# Patient Record
Sex: FEMALE | Race: WHITE | NOT HISPANIC OR LATINO
[De-identification: names, ages, dates, MRNs, and addresses within clinical notes are randomized per-mention and may not be internally consistent; named-entity substitution may affect disease eponyms.]

---

## 2018-02-13 ENCOUNTER — TRANSCRIPTION ENCOUNTER (OUTPATIENT)
Age: 68
End: 2018-02-13

## 2018-05-04 ENCOUNTER — OUTPATIENT (OUTPATIENT)
Dept: OUTPATIENT SERVICES | Facility: HOSPITAL | Age: 68
LOS: 1 days | End: 2018-05-04
Payer: MEDICARE

## 2018-05-04 DIAGNOSIS — Z98.811 DENTAL RESTORATION STATUS: Chronic | ICD-10-CM

## 2018-05-04 DIAGNOSIS — J45.30 MILD PERSISTENT ASTHMA, UNCOMPLICATED: ICD-10-CM

## 2018-05-04 PROCEDURE — 94729 DIFFUSING CAPACITY: CPT

## 2018-05-04 PROCEDURE — 94010 BREATHING CAPACITY TEST: CPT

## 2018-05-04 PROCEDURE — 94060 EVALUATION OF WHEEZING: CPT

## 2019-01-24 ENCOUNTER — EMERGENCY (EMERGENCY)
Facility: HOSPITAL | Age: 69
LOS: 1 days | Discharge: HOME | End: 2019-01-24
Attending: EMERGENCY MEDICINE
Payer: MEDICARE

## 2019-01-24 VITALS
OXYGEN SATURATION: 99 % | SYSTOLIC BLOOD PRESSURE: 132 MMHG | HEART RATE: 77 BPM | RESPIRATION RATE: 16 BRPM | WEIGHT: 149.91 LBS | DIASTOLIC BLOOD PRESSURE: 78 MMHG | HEIGHT: 62 IN | TEMPERATURE: 98 F

## 2019-01-24 VITALS
SYSTOLIC BLOOD PRESSURE: 115 MMHG | DIASTOLIC BLOOD PRESSURE: 57 MMHG | HEART RATE: 60 BPM | TEMPERATURE: 98 F | OXYGEN SATURATION: 100 % | RESPIRATION RATE: 20 BRPM

## 2019-01-24 DIAGNOSIS — Z98.811 DENTAL RESTORATION STATUS: Chronic | ICD-10-CM

## 2019-01-24 PROCEDURE — 99282 EMERGENCY DEPT VISIT SF MDM: CPT

## 2019-01-24 NOTE — ED PROVIDER NOTE - OBJECTIVE STATEMENT
68F with dry skin, has been itchy, went on internet and looked up skin eating bacteria, now worried that she has that on both of her knees bc knees are dry. Varicose veins have become more prominent. Saw her dermatologist for cracked heels, given ointment. No fever or chills.

## 2019-04-19 ENCOUNTER — TRANSCRIPTION ENCOUNTER (OUTPATIENT)
Age: 69
End: 2019-04-19

## 2021-06-25 ENCOUNTER — EMERGENCY (EMERGENCY)
Facility: HOSPITAL | Age: 71
LOS: 1 days | Discharge: HOME | End: 2021-06-25
Attending: STUDENT IN AN ORGANIZED HEALTH CARE EDUCATION/TRAINING PROGRAM
Payer: MEDICARE

## 2021-06-25 VITALS
HEIGHT: 60 IN | DIASTOLIC BLOOD PRESSURE: 107 MMHG | RESPIRATION RATE: 18 BRPM | SYSTOLIC BLOOD PRESSURE: 170 MMHG | TEMPERATURE: 99 F | OXYGEN SATURATION: 96 % | HEART RATE: 85 BPM | WEIGHT: 164.91 LBS

## 2021-06-25 VITALS
HEART RATE: 95 BPM | TEMPERATURE: 99 F | RESPIRATION RATE: 18 BRPM | OXYGEN SATURATION: 94 % | DIASTOLIC BLOOD PRESSURE: 79 MMHG | SYSTOLIC BLOOD PRESSURE: 146 MMHG

## 2021-06-25 DIAGNOSIS — Z98.811 DENTAL RESTORATION STATUS: Chronic | ICD-10-CM

## 2021-06-25 PROCEDURE — 73630 X-RAY EXAM OF FOOT: CPT | Mod: 26,LT

## 2021-06-25 PROCEDURE — 73630 X-RAY EXAM OF FOOT: CPT

## 2021-06-25 PROCEDURE — 93971 EXTREMITY STUDY: CPT | Mod: 26,LT

## 2021-06-25 PROCEDURE — 99284 EMERGENCY DEPT VISIT MOD MDM: CPT | Mod: 25

## 2021-06-25 PROCEDURE — 93971 EXTREMITY STUDY: CPT

## 2021-06-25 PROCEDURE — 99284 EMERGENCY DEPT VISIT MOD MDM: CPT | Mod: GC

## 2021-06-25 NOTE — ED PROVIDER NOTE - ATTENDING CONTRIBUTION TO CARE
leg pain seen in qdoc, imaging ordered, found to have bakers cyst on the L side, pt w/ no crepitus in the lower leg, no rashes on the lower extremities, no warmth of the leg, no features to suggest cellulitis, pt w/ 2+ DP pusle and intact cap refill <2 sec unlikely arterial occlusion, pt w/ no singificant swelling of the lower extremities- Findings c/w venous insufficiency. pt reports labs have been drawn in the past 2 months, normal po intake. pt reports she feels comfortable w/ dc will be walking .5 miles to  her car and feels ok doing that at this time. offered to assist w/ transportation through social work but pt declined

## 2021-06-25 NOTE — ED ADULT NURSE NOTE - OBJECTIVE STATEMENT
71 y/o female coming in from home complaining of left ankle and left knee pain. AOx4, ambulatory at baseline, PMH HTN, HLD, asthma. Pt. states she has had left ankle pain and left knee pain for approximately 2 weeks now with left ankle swelling. Left ankle appears more swollen than the right ankle. No redness, bruising, or discoloration noted. No discharge noted. PMS intact. Left and right knees appear equal in size and color. Denies any falls or trauma to the area. Pt. denies any fevers/chills. Denies chest pain, abdominal pain, SOB or any other complaints. Pt. is otherwise well. Answers some questions inappropriately. For example, when asked for her birthday, she states "Look at the bracelet. I don't know why everyone keeps asking me". Pt. also does not like anything explained to her. VSS. Will continue to reassess.

## 2021-06-25 NOTE — ED PROVIDER NOTE - PHYSICAL EXAMINATION
GENERAL: no acute distress, non-toxic appearing  HEENT: dry L eye conjunctiva, AYDE, EOMI without nystagmus, no proptosis of eye, TM's clear nonerythematous no vesicles appreciated, no oral lesions  CARDIAC: regular rate and regular rhythm, normal S1 and S2, no appreciable murmurs  PULM: clear to ascultation bilaterally, no appreciable crackles, rales, rhonchi, or wheezing, sats 100% on RA, no increased work of breathing  GI: abdomen nondistended, soft, nontender  : no CVA tenderness, no suprapubic tenderness  NEURO: alert and oriented x 3, normal speech, L facial weakness without forehead sparing  MSK: no visible deformities, no peripheral edema  SKIN: no visible rashes or vesicles  PSYCH: appropriate mood and affect GENERAL: no acute distress, non-toxic appearing  HEENT: normal conjunctiva, oral mucosa moist  CARDIAC: regular rate and regular rhythm, 2+ dp pulses bilaterally  PULM: no incr wob, sats well on RA  GI: abdomen nondistended, soft, nontender  : no suprapubic tenderness  NEURO: alert and oriented x 3, normal speech, good strength bilateral lower ext  MSK: L ankle/distal tib swelling, pt able to range ankle without pain, mild fulness behind L knee  SKIN: no visible rashes/skin breaks  PSYCH: appropriate mood and affect

## 2021-06-25 NOTE — ED PROVIDER NOTE - CLINICAL SUMMARY MEDICAL DECISION MAKING FREE TEXT BOX
Likely bell's palsy as forehead is weak as well, trouble closing eye no vision changes, will give meds/eye drops, neuro/ophtho f/u. DC. Likely venous insufficiency 2/2 baker's cyst. Will do US/xrays. Likely dc.

## 2021-06-25 NOTE — ED PROVIDER NOTE - NSFOLLOWUPCLINICSTOKEN_GEN_ALL_ED_FT
737352:4-6 Days|| ||00\01||False;707201:4-6 Days|| ||00\01||False; 029250:1-3 Days|| ||00\01||False;194015:1-3 Days|| ||00\01||False;

## 2021-06-25 NOTE — ED PROVIDER NOTE - NSFOLLOWUPINSTRUCTIONS_ED_ALL_ED_FT
- Please follow up with your Primary Care Doctor within 48 hours. Bring your results from today.    - Please follow up with Neurology within 1 week.     - Please follow up with Ophthalmology within 1 week.     - Take prescribed medications as indicated:  1. Prednisone  2. Valacyclovir  3. Eye drops (daytime)  4. Eye ointment (before bed/before naps)    - You may also tape your eyes closed before you sleep and/or during the day as this will help prevent eye trauma and eye dryness.    - Tylenol up to 650 mg every 6 hours as needed for pain and/or Ibuprofen up to 600 mg every 6 hours as needed for pain.    - Be sure to return to the ED if you develop new or worsening symptoms. Specific signs and symptoms to be vigilant of: fever or chills, chest pain, difficulty breathing, palpitations, lightheadedness or loss of consciousness, headache, vision changes, slurred speech, difficulty swallowing or drooling, facial droop, weakness in the arms or legs, or any other distressing symptoms. - Please follow up with your Primary Care Doctor within 48 hours. Bring your results from today.    - Please follow up with Sports Medicine Clinic for your knee cyst.    - You may try compression socks or wrapping extremity with ace wrap. Elevate leg.     - Tylenol up to 650 mg every 6 hours as needed for pain.     - Be sure to return to the ED if you develop new or worsening symptoms. Specific signs and symptoms to be vigilant of: fever or chills, chest pain, difficulty breathing, palpitations, lightheadedness or loss of consciousness, difficulty ambulating, or any other distressing symptoms.

## 2021-06-25 NOTE — ED ADULT NURSE NOTE - NSIMPLEMENTINTERV_GEN_ALL_ED
Implemented All Universal Safety Interventions:  [place] to call system. Call bell, personal items and telephone within reach. Instruct patient to call for assistance. Room bathroom lighting operational. Non-slip footwear when patient is off stretcher. Physically safe environment: no spills, clutter or unnecessary equipment. Stretcher in lowest position, wheels locked, appropriate side rails in place.

## 2021-06-25 NOTE — ED PROVIDER NOTE - RAPID ASSESSMENT
70 F with PMHx of HLD presents with LLE edema x2 weeks. Pt able to ambulate without pain. States PMD concerned it can possibly be related to HLD medications. Was told to discontinue medication. Denies fever, chest pain, SOB, trauma, fall, numbness, weakness, or tingling to LLE, hx of VTE.     Scribe Statement: Benji LEIVA Tiffany, attest that this documentation has been prepared under the direction and in the presence of Lisa Torres (PA) 70 F with PMHx of HLD presents with LLE edema x2 weeks. Pt able to ambulate without pain. States PMD concerned it can possibly be related to HLD medications. Was told to discontinue medication. Denies fever, chest pain, SOB, trauma, fall, numbness, weakness, or tingling to LLE, hx of VTE.     Scribe Statement: Benji LEIVA Tiffany, attest that this documentation has been prepared under the direction and in the presence of Lisa Torres (PA)    **Pt seen, briefly, in the waiting room by Lisa Torres PA-C for rapid assessment. documentation completed by Ro Leblanc Pt to be sent to main ED for full physical examination and assessment - all orders placed to be followed by MD in the main ED**

## 2021-06-25 NOTE — ED PROVIDER NOTE - PATIENT PORTAL LINK FT
You can access the FollowMyHealth Patient Portal offered by [facility] by registering at the following website: [email or web address]. By joining [employer]’s FollowMyHealth portal, you will also be able to view your health information using other applications (apps) compatible with our system. You can access the FollowMyHealth Patient Portal offered by [facility] by registering at the following website: [email or web address]. By joining [employer]’s FollowMyHealth portal, you will also be able to view your health information using other applications (apps) compatible with our system.

## 2021-06-25 NOTE — ED PROVIDER NOTE - OBJECTIVE STATEMENT
70F PMH HTN presents with L eye dryness for 3 days and L facial weakness noticed today. Denies any loss of vision. Denies any head trauma, headaches, weakness/numbness/tingling anywhere else in the body, denies any recent fevers/chills, uri sxs, ear pain, cough, chest pain, sob, palpitations, vomiting/diarrhea, rash. No recent hiking/tick bites. Went to  yesterday and was dx with pink eye and given abx drops. 70F PMH HTN presents with L eye dryness for 3 days and L facial weakness noticed today. Denies any loss of vision. Denies any head trauma, headaches, weakness/numbness/tingling anywhere else in the body, denies any recent fevers/chills, uri sxs, ear pain, cough, chest pain, sob, palpitations, vomiting/diarrhea, rash. No recent hiking/tick bites. Went to  yesterday and was dx with pink eye and given abx drops.    +L eye dryness, L facial weakness    GENERAL: no acute distress, non-toxic appearing  HEENT: dry L eye conjunctiva, AYDE, EOMI without nystagmus, no proptosis of eye, TM's clear nonerythematous no vesicles appreciated, no oral lesions  CARDIAC: regular rate and regular rhythm, normal S1 and S2, no appreciable murmurs  PULM: clear to ascultation bilaterally, no appreciable crackles, rales, rhonchi, or wheezing, sats 100% on RA, no increased work of breathing  GI: abdomen nondistended, soft, nontender  : no CVA tenderness, no suprapubic tenderness  NEURO: alert and oriented x 3, normal speech, L facial weakness without forehead sparing  MSK: no visible deformities, no peripheral edema  SKIN: no visible rashes or vesicles  PSYCH: appropriate mood and affect      Likely bell's palsy as forehead is weak as well, trouble closing eye no vision changes, will give meds/eye drops, neuro/ophtho f/u. DC. 70F PMH HLD presents to the ED with LLE swelling for 2 weeks in the context of some knee pain. Knee pain atraumatic and located posteriorly. Denies any rash, fevers/chills, chest pain/sob. Pt is able to ambulate and bear weight. Denies weakness of extremity.

## 2021-06-25 NOTE — ED PROVIDER NOTE - NSFOLLOWUPCLINICS_GEN_ALL_ED_FT
[facility] Ophthalmology  Ophthalmology  [address]  [place] [address]  Phone: [phone]  Fax:   Follow Up Time: 4-6 Days    [facility] Specialty Clinics  Neurology  [address] [place] [address] [address] [place] [address]  Phone: [phone]  Fax:   Follow Up Time: 4-6 Days     [facility] Sports Medicine  Sports Medicine  [address] [place] [address]  Phone: [phone]  Fax:   Follow Up Time: 1-3 Days    [facility]  Internal Medicine  [address] [place] [address]  Phone: [phone]  Fax: [phone]  Follow Up Time: 1-3 Days

## 2022-12-13 NOTE — ED ADULT NURSE NOTE - NS TRANSFER PATIENT BELONGINGS
Clothing Opioid Counseling: I discussed with the patient the potential side effects of opioids including but not limited to addiction, altered mental status, and depression. I stressed avoiding alcohol, benzodiazepines, muscle relaxants and sleep aids unless specifically okayed by a physician. The patient verbalized understanding of the proper use and possible adverse effects of opioids. All of the patient's questions and concerns were addressed. They were instructed to flush the remaining pills down the toilet if they did not need them for pain.

## 2023-04-06 NOTE — ED ADULT TRIAGE NOTE - NS ED TRIAGE HISTORIAN
[FreeTextEntry1] : General - pleasant well developed \par Head/Face - Atraumatic\par Eyes - PERRL, no sclera icterus\par ENMT - MMM, normal nose/ears, no oropharyngeal lesion\par Neck - supple, no thyromegaly, no masses   \par Lymphatics - no palpable adenopathy\par CV - Regular Rate and Rhythm, no rub or gallop\par Pulm - CTAB\par Abd - soft, non-tender, nondistended, palpable liver a few cm below the xiphoid process. \par Ext - no c/c/e, normal ROM in each extremity \par Back/spine - no masses/tenderness\par Skin - no skin lesion on sun exposed skin, no jaundice\par Neuro - alert and oriented x 3\par Psych - normal mood and affect\par \par  Patient

## 2024-06-11 ENCOUNTER — APPOINTMENT (OUTPATIENT)
Dept: ORTHOPEDIC SURGERY | Facility: CLINIC | Age: 74
End: 2024-06-11
Payer: MEDICARE

## 2024-06-11 VITALS — WEIGHT: 166 LBS | BODY MASS INDEX: 32.59 KG/M2 | HEIGHT: 60 IN

## 2024-06-11 DIAGNOSIS — I10 ESSENTIAL (PRIMARY) HYPERTENSION: ICD-10-CM

## 2024-06-11 DIAGNOSIS — Z78.9 OTHER SPECIFIED HEALTH STATUS: ICD-10-CM

## 2024-06-11 DIAGNOSIS — J45.909 UNSPECIFIED ASTHMA, UNCOMPLICATED: ICD-10-CM

## 2024-06-11 PROCEDURE — 73562 X-RAY EXAM OF KNEE 3: CPT | Mod: RT

## 2024-06-11 PROCEDURE — 99203 OFFICE O/P NEW LOW 30 MIN: CPT

## 2024-06-11 NOTE — ASSESSMENT
[FreeTextEntry1] : R knee pain over last few days without injury. Exam and XR consistent with OA.   -Discussed the pathophysiology of knee OA along with treatments including survellience/monitoring, use of NSAIDs/Tylenol/ice, use of compression sleeve, HEP, PT, injection therapy, procedural and surgical therapy.  - Recommended trial of tylenol or nsaid as tolerated - Offered PT referral - Follow up in 4-6 weeks if not improved, could consider CSI

## 2024-06-11 NOTE — HISTORY OF PRESENT ILLNESS
[6] : 6 [0] : 0 [Intermittent] : intermittent [Ice] : ice [Standing] : standing [Walking] : walking [Stairs] : stairs [de-identified] : 06/11/2024: Patient is a 73 year y.o. female presenting for evaluation of right knee pain. Onset: a few days ago. Pt states that she has been using her right knee to climb on top her pillow top bed and has been noticing right knee pain since. Pain intensity has remained the same, walking, standing and stairs aggravate her knee pain. No radiating pain, no numbness/tingling, no weakness, no swelling. Pt has been using ice as needed for relief, no OTC treatment. Non-smoker, no blood thinners, nondiabetic.    [] : no [FreeTextEntry1] : right knee

## 2024-06-11 NOTE — IMAGING
[de-identified] : L knee: - No obvious deformity or swelling - No pain with palpation of medial or lateral joint line. - ROM from 135 degrees of flexion to 0 degrees extension. - 5/5 strength with knee flexion and extension - Stable varus, valgus testing - Distally neurovascularly intact.    R knee: - No obvious deformity or swelling - Medial joint line pain - No pain with palpation of lateral joint line. - ROM from 125 degrees of flexion to 0 degrees extension. - 5/5 strength with knee flexion and extension - Stable varus, valgus testing - Mild pain with valgus stressing without laxity - Distally neurovascularly intact.    [Right] : right knee [There are no fractures, subluxations or dislocations. No significant abnormalities are seen] : There are no fractures, subluxations or dislocations. No significant abnormalities are seen [Mild tricompartmental OA lateral narrowing] : Mild tricompartmental OA lateral narrowing [Moderate tricompartmental OA medial narrowing] : Moderate tricompartmental OA medial narrowing [Mild patellofemoral OA] : Mild patellofemoral OA

## 2024-06-11 NOTE — PHYSICAL EXAM
[de-identified] : Constitutional: Well developed, well nourished, able to communicate Neuro: Normal sensation, No focal deficits Skin: Intact CV: Peripheral vascular exam grossly normal Pulm: No signs of respiratory distress Psych: Oriented, normal mood and affect

## 2024-06-14 ENCOUNTER — APPOINTMENT (OUTPATIENT)
Dept: ORTHOPEDIC SURGERY | Facility: CLINIC | Age: 74
End: 2024-06-14
Payer: MEDICARE

## 2024-06-14 PROCEDURE — 99213 OFFICE O/P EST LOW 20 MIN: CPT | Mod: 25

## 2024-06-14 PROCEDURE — J3490M: CUSTOM | Mod: NC

## 2024-06-14 PROCEDURE — 20610 DRAIN/INJ JOINT/BURSA W/O US: CPT | Mod: RT

## 2024-06-14 NOTE — PHYSICAL EXAM
[de-identified] : Constitutional: Well developed, well nourished, able to communicate Neuro: Normal sensation, No focal deficits Skin: Intact CV: Peripheral vascular exam grossly normal Pulm: No signs of respiratory distress Psych: Oriented, normal mood and affect

## 2024-06-14 NOTE — IMAGING
[Right] : right knee [There are no fractures, subluxations or dislocations. No significant abnormalities are seen] : There are no fractures, subluxations or dislocations. No significant abnormalities are seen [Mild tricompartmental OA lateral narrowing] : Mild tricompartmental OA lateral narrowing [Moderate tricompartmental OA medial narrowing] : Moderate tricompartmental OA medial narrowing [Mild patellofemoral OA] : Mild patellofemoral OA [de-identified] : L knee: - No obvious deformity or swelling - No pain with palpation of medial or lateral joint line. - ROM from 135 degrees of flexion to 0 degrees extension. - 5/5 strength with knee flexion and extension - Stable varus, valgus testing - Distally neurovascularly intact.    R knee: - No obvious deformity or swelling - Medial joint line pain - No pain with palpation of lateral joint line. - ROM from 125 degrees of flexion to 0 degrees extension. - 5/5 strength with knee flexion and extension - Stable varus, valgus testing - Mild pain with valgus stressing without laxity - Distally neurovascularly intact.

## 2024-06-14 NOTE — HISTORY OF PRESENT ILLNESS
[6] : 6 [0] : 0 [Intermittent] : intermittent [Ice] : ice [Standing] : standing [Walking] : walking [Stairs] : stairs [de-identified] : 06/14/2024: patient is here to follow up on right knee pain. she states that since last visit she is feeling same as last visit. Hasn't started PT yet.   06/11/2024: Patient is a 73 year y.o. female presenting for evaluation of right knee pain. Onset: a few days ago. Pt states that she has been using her right knee to climb on top her pillow top bed and has been noticing right knee pain since. Pain intensity has remained the same, walking, standing and stairs aggravate her knee pain. No radiating pain, no numbness/tingling, no weakness, no swelling. Pt has been using ice as needed for relief, no OTC treatment. Non-smoker, no blood thinners, nondiabetic.    [] : no [FreeTextEntry1] : right knee

## 2024-06-14 NOTE — IMAGING
[Right] : right knee [There are no fractures, subluxations or dislocations. No significant abnormalities are seen] : There are no fractures, subluxations or dislocations. No significant abnormalities are seen [Mild tricompartmental OA lateral narrowing] : Mild tricompartmental OA lateral narrowing [Moderate tricompartmental OA medial narrowing] : Moderate tricompartmental OA medial narrowing [Mild patellofemoral OA] : Mild patellofemoral OA [de-identified] : L knee: - No obvious deformity or swelling - No pain with palpation of medial or lateral joint line. - ROM from 135 degrees of flexion to 0 degrees extension. - 5/5 strength with knee flexion and extension - Stable varus, valgus testing - Distally neurovascularly intact.    R knee: - No obvious deformity or swelling - Medial joint line pain - No pain with palpation of lateral joint line. - ROM from 125 degrees of flexion to 0 degrees extension. - 5/5 strength with knee flexion and extension - Stable varus, valgus testing - Mild pain with valgus stressing without laxity - Distally neurovascularly intact.

## 2024-06-14 NOTE — ASSESSMENT
[FreeTextEntry1] : R knee pain over last few days without injury. Exam and XR consistent with OA.   -Discussed the pathophysiology of knee OA along with treatments including survellience/monitoring, use of NSAIDs/Tylenol/ice, use of compression sleeve, HEP, PT, injection therapy, procedural and surgical therapy.  - CSI Right knee given and tolerated well - recommend tylenol and ice as needed for injection site relief -Will start PT -Follow up 3 months or sooner if no relief with inj

## 2024-06-14 NOTE — HISTORY OF PRESENT ILLNESS
[6] : 6 [0] : 0 [Intermittent] : intermittent [Ice] : ice [Standing] : standing [Walking] : walking [Stairs] : stairs [de-identified] : 06/14/2024: patient is here to follow up on right knee pain. she states that since last visit she is feeling same as last visit. Hasn't started PT yet.   06/11/2024: Patient is a 73 year y.o. female presenting for evaluation of right knee pain. Onset: a few days ago. Pt states that she has been using her right knee to climb on top her pillow top bed and has been noticing right knee pain since. Pain intensity has remained the same, walking, standing and stairs aggravate her knee pain. No radiating pain, no numbness/tingling, no weakness, no swelling. Pt has been using ice as needed for relief, no OTC treatment. Non-smoker, no blood thinners, nondiabetic.    [] : no [FreeTextEntry1] : right knee

## 2024-06-14 NOTE — PROCEDURE
[FreeTextEntry3] : A pre-procedure verification was performed by asking the patient to state their name, , and the location of the procedure being performed in their own words.  A review of allergies was accomplished through dialog and the patient, ending thus in the full agreement. The risks and benefits were explained and consent were obtained verbally.   Procedure: Intraarticular Injection >>  Knee, R Consent was obtained. Patients questions were answered to the best of my ability prior to procedure. Injection site and surrounding bony landmarks were palpated and marked prior to proceeding. Site was cleaned and prepped in the typical fashion using alcohol swabs. Cold spray used on skin for patient comfort. Needle was advanced into the intraarticular space from the inferolateral approach. A combination of 3cc Marcaine w/o Epi and 80mg Depomedrol was then injected into the joint. Patient tolerated the procedure well, without significant complications. Minimal (<3cc) blood loss experienced.

## 2024-06-14 NOTE — PHYSICAL EXAM
[de-identified] : Constitutional: Well developed, well nourished, able to communicate Neuro: Normal sensation, No focal deficits Skin: Intact CV: Peripheral vascular exam grossly normal Pulm: No signs of respiratory distress Psych: Oriented, normal mood and affect

## 2024-06-25 ENCOUNTER — APPOINTMENT (OUTPATIENT)
Dept: ORTHOPEDIC SURGERY | Facility: CLINIC | Age: 74
End: 2024-06-25
Payer: MEDICARE

## 2024-06-25 PROCEDURE — 20610 DRAIN/INJ JOINT/BURSA W/O US: CPT | Mod: RT

## 2024-06-25 PROCEDURE — 99213 OFFICE O/P EST LOW 20 MIN: CPT | Mod: 25

## 2024-07-02 ENCOUNTER — APPOINTMENT (OUTPATIENT)
Dept: ORTHOPEDIC SURGERY | Facility: CLINIC | Age: 74
End: 2024-07-02
Payer: MEDICARE

## 2024-07-02 PROBLEM — M17.11 PRIMARY OSTEOARTHRITIS OF RIGHT KNEE: Status: ACTIVE | Noted: 2024-06-11

## 2024-07-02 PROCEDURE — 20610 DRAIN/INJ JOINT/BURSA W/O US: CPT | Mod: RT

## 2024-07-09 ENCOUNTER — APPOINTMENT (OUTPATIENT)
Dept: ORTHOPEDIC SURGERY | Facility: CLINIC | Age: 74
End: 2024-07-09
Payer: MEDICARE

## 2024-07-09 DIAGNOSIS — M17.11 UNILATERAL PRIMARY OSTEOARTHRITIS, RIGHT KNEE: ICD-10-CM

## 2024-07-09 PROCEDURE — 20610 DRAIN/INJ JOINT/BURSA W/O US: CPT | Mod: RT

## 2024-07-09 RX ORDER — ALBUTEROL 90 MCG
AEROSOL (GRAM) INHALATION
Refills: 0 | Status: ACTIVE | COMMUNITY

## 2024-07-09 RX ORDER — MONTELUKAST SODIUM 10 MG/1
TABLET, FILM COATED ORAL
Refills: 0 | Status: ACTIVE | COMMUNITY

## 2024-07-09 RX ORDER — LISINOPRIL 30 MG/1
TABLET ORAL
Refills: 0 | Status: ACTIVE | COMMUNITY

## 2024-07-09 RX ORDER — UMECLIDINIUM 62.5 UG/1
AEROSOL, POWDER ORAL
Refills: 0 | Status: ACTIVE | COMMUNITY

## 2024-07-09 RX ORDER — FLUTICASONE FUROATE AND VILANTEROL TRIFENATATE 50; 25 UG/1; UG/1
POWDER RESPIRATORY (INHALATION)
Refills: 0 | Status: ACTIVE | COMMUNITY

## 2024-07-17 ENCOUNTER — APPOINTMENT (OUTPATIENT)
Dept: ORTHOPEDIC SURGERY | Facility: CLINIC | Age: 74
End: 2024-07-17

## 2024-12-19 ENCOUNTER — OFFICE (OUTPATIENT)
Facility: LOCATION | Age: 74
Setting detail: OPHTHALMOLOGY
End: 2024-12-19
Payer: MEDICARE

## 2024-12-19 DIAGNOSIS — H01.004: ICD-10-CM

## 2024-12-19 DIAGNOSIS — H52.7: ICD-10-CM

## 2024-12-19 DIAGNOSIS — H01.001: ICD-10-CM

## 2024-12-19 DIAGNOSIS — H25.13: ICD-10-CM

## 2024-12-19 DIAGNOSIS — H11.153: ICD-10-CM

## 2024-12-19 PROCEDURE — 92004 COMPRE OPH EXAM NEW PT 1/>: CPT | Performed by: OPHTHALMOLOGY

## 2024-12-19 ASSESSMENT — REFRACTION_CURRENTRX
OD_CYLINDER: 0.00
OD_SPHERE: +3.00
OS_SPHERE: +3.00
OD_AXIS: 180
OS_AXIS: 103
OS_CYLINDER: -0.50
OS_OVR_VA: 20/
OD_OVR_VA: 20/

## 2024-12-19 ASSESSMENT — REFRACTION_AUTOREFRACTION
OD_CYLINDER: -0.75
OS_AXIS: 111
OS_SPHERE: +0.50
OD_SPHERE: +0.75
OD_AXIS: 089
OS_CYLINDER: -1.00

## 2024-12-19 ASSESSMENT — TONOMETRY
OS_IOP_MMHG: 19
OD_IOP_MMHG: 19

## 2024-12-19 ASSESSMENT — REFRACTION_MANIFEST
OD_SPHERE: +3.00
OD_CYLINDER: SPH
OS_AXIS: 105
OS_CYLINDER: -0.50
OS_SPHERE: +3.00

## 2024-12-19 ASSESSMENT — KERATOMETRY
OS_K1POWER_DIOPTERS: 43.25
OD_K1POWER_DIOPTERS: 44.00
OD_K2POWER_DIOPTERS: 44.50
OS_K2POWER_DIOPTERS: 43.50
OD_AXISANGLE_DEGREES: 114
OS_AXISANGLE_DEGREES: 068

## 2024-12-19 ASSESSMENT — LID EXAM ASSESSMENTS
OD_BLEPHARITIS: RUL 1+
OS_BLEPHARITIS: LUL 1+

## 2024-12-19 ASSESSMENT — VISUAL ACUITY
OS_BCVA: 20/30+2
OD_BCVA: 20/30+2

## 2024-12-19 ASSESSMENT — CONFRONTATIONAL VISUAL FIELD TEST (CVF)
OS_FINDINGS: FULL
OD_FINDINGS: FULL

## 2025-05-10 NOTE — ED PROVIDER NOTE - SKIN, MLM
Improving, Discussed supportive care and return parameters.        
Skin normal color for race, warm, dry and intact. No evidence of rash. small excoriated area on right knee, pt shares that she scratched. no redness, warmth or induration

## 2025-07-03 ENCOUNTER — APPOINTMENT (OUTPATIENT)
Dept: ORTHOPEDIC SURGERY | Facility: CLINIC | Age: 75
End: 2025-07-03
Payer: MEDICARE

## 2025-07-03 PROCEDURE — 99202 OFFICE O/P NEW SF 15 MIN: CPT

## 2025-07-09 ENCOUNTER — NON-APPOINTMENT (OUTPATIENT)
Age: 75
End: 2025-07-09

## 2025-07-10 ENCOUNTER — APPOINTMENT (OUTPATIENT)
Dept: ORTHOPEDIC SURGERY | Facility: CLINIC | Age: 75
End: 2025-07-10

## 2025-07-10 ENCOUNTER — APPOINTMENT (OUTPATIENT)
Dept: ORTHOPEDIC SURGERY | Facility: CLINIC | Age: 75
End: 2025-07-10
Payer: MEDICARE

## 2025-07-10 PROCEDURE — 20610 DRAIN/INJ JOINT/BURSA W/O US: CPT | Mod: RT

## 2025-07-10 PROCEDURE — 99214 OFFICE O/P EST MOD 30 MIN: CPT | Mod: 25

## 2025-07-17 ENCOUNTER — APPOINTMENT (OUTPATIENT)
Dept: ORTHOPEDIC SURGERY | Facility: CLINIC | Age: 75
End: 2025-07-17
Payer: MEDICARE

## 2025-07-17 PROCEDURE — 20610 DRAIN/INJ JOINT/BURSA W/O US: CPT | Mod: RT

## 2025-07-17 PROCEDURE — 99024 POSTOP FOLLOW-UP VISIT: CPT

## 2025-07-24 ENCOUNTER — APPOINTMENT (OUTPATIENT)
Dept: ORTHOPEDIC SURGERY | Facility: CLINIC | Age: 75
End: 2025-07-24
Payer: MEDICARE

## 2025-07-24 DIAGNOSIS — M17.11 UNILATERAL PRIMARY OSTEOARTHRITIS, RIGHT KNEE: ICD-10-CM

## 2025-07-24 PROCEDURE — 99024 POSTOP FOLLOW-UP VISIT: CPT

## 2025-07-24 PROCEDURE — 20610 DRAIN/INJ JOINT/BURSA W/O US: CPT | Mod: RT
